# Patient Record
Sex: MALE | Race: WHITE | Employment: FULL TIME | URBAN - METROPOLITAN AREA
[De-identification: names, ages, dates, MRNs, and addresses within clinical notes are randomized per-mention and may not be internally consistent; named-entity substitution may affect disease eponyms.]

---

## 2017-01-18 ENCOUNTER — ANTI-COAG VISIT (OUTPATIENT)
Dept: FAMILY MEDICINE CLINIC | Age: 57
End: 2017-01-18

## 2017-01-18 DIAGNOSIS — Z86.718 PERSONAL HISTORY OF DVT (DEEP VEIN THROMBOSIS): ICD-10-CM

## 2017-01-18 LAB
INTERNATIONAL NORMALIZATION RATIO, POC: 2.1
PROTHROMBIN TIME, POC: NORMAL

## 2017-01-18 PROCEDURE — 99999 PR OFFICE/OUTPT VISIT,PROCEDURE ONLY: CPT | Performed by: INTERNAL MEDICINE

## 2017-01-18 PROCEDURE — 85610 PROTHROMBIN TIME: CPT | Performed by: INTERNAL MEDICINE

## 2017-02-15 ENCOUNTER — NURSE ONLY (OUTPATIENT)
Dept: FAMILY MEDICINE CLINIC | Age: 57
End: 2017-02-15

## 2017-02-15 DIAGNOSIS — Z86.718 PERSONAL HISTORY OF DVT (DEEP VEIN THROMBOSIS): ICD-10-CM

## 2017-02-15 LAB
INTERNATIONAL NORMALIZATION RATIO, POC: 2.1
PROTHROMBIN TIME, POC: NORMAL

## 2017-02-15 PROCEDURE — 99999 PR OFFICE/OUTPT VISIT,PROCEDURE ONLY: CPT | Performed by: INTERNAL MEDICINE

## 2017-02-15 PROCEDURE — 85610 PROTHROMBIN TIME: CPT | Performed by: INTERNAL MEDICINE

## 2017-03-03 RX ORDER — WARFARIN SODIUM 5 MG/1
TABLET ORAL
Qty: 30 TABLET | Refills: 8 | Status: SHIPPED | OUTPATIENT
Start: 2017-03-03 | End: 2018-04-30 | Stop reason: ALTCHOICE

## 2017-03-15 ENCOUNTER — ANTI-COAG VISIT (OUTPATIENT)
Dept: FAMILY MEDICINE CLINIC | Age: 57
End: 2017-03-15

## 2017-03-15 DIAGNOSIS — Z86.718 PERSONAL HISTORY OF DVT (DEEP VEIN THROMBOSIS): Primary | ICD-10-CM

## 2017-03-15 DIAGNOSIS — Z79.01 LONG TERM CURRENT USE OF ANTICOAGULANTS WITH INR GOAL OF 2.0-3.0: ICD-10-CM

## 2017-03-15 LAB
INTERNATIONAL NORMALIZATION RATIO, POC: 2.4
PROTHROMBIN TIME, POC: NORMAL

## 2017-03-15 PROCEDURE — 99999 PR OFFICE/OUTPT VISIT,PROCEDURE ONLY: CPT | Performed by: INTERNAL MEDICINE

## 2017-03-15 PROCEDURE — 85610 PROTHROMBIN TIME: CPT | Performed by: INTERNAL MEDICINE

## 2017-03-31 ENCOUNTER — TELEPHONE (OUTPATIENT)
Dept: FAMILY MEDICINE CLINIC | Age: 57
End: 2017-03-31

## 2017-04-12 ENCOUNTER — ANTI-COAG VISIT (OUTPATIENT)
Dept: FAMILY MEDICINE CLINIC | Age: 57
End: 2017-04-12

## 2017-04-12 DIAGNOSIS — Z79.01 LONG TERM CURRENT USE OF ANTICOAGULANTS WITH INR GOAL OF 2.0-3.0: ICD-10-CM

## 2017-04-12 DIAGNOSIS — Z86.718 PERSONAL HISTORY OF DVT (DEEP VEIN THROMBOSIS): ICD-10-CM

## 2017-04-12 LAB
INTERNATIONAL NORMALIZATION RATIO, POC: 2.2
PROTHROMBIN TIME, POC: NORMAL

## 2017-04-12 PROCEDURE — 85610 PROTHROMBIN TIME: CPT | Performed by: INTERNAL MEDICINE

## 2017-04-12 PROCEDURE — 99999 PR OFFICE/OUTPT VISIT,PROCEDURE ONLY: CPT | Performed by: INTERNAL MEDICINE

## 2017-05-10 ENCOUNTER — TELEPHONE (OUTPATIENT)
Dept: FAMILY MEDICINE CLINIC | Age: 57
End: 2017-05-10

## 2017-05-10 ENCOUNTER — ANTI-COAG VISIT (OUTPATIENT)
Dept: FAMILY MEDICINE CLINIC | Age: 57
End: 2017-05-10

## 2017-05-10 DIAGNOSIS — Z86.718 PERSONAL HISTORY OF DVT (DEEP VEIN THROMBOSIS): ICD-10-CM

## 2017-05-10 DIAGNOSIS — Z79.01 LONG TERM CURRENT USE OF ANTICOAGULANTS WITH INR GOAL OF 2.0-3.0: ICD-10-CM

## 2017-05-10 LAB
INTERNATIONAL NORMALIZATION RATIO, POC: 2.4
PROTHROMBIN TIME, POC: NORMAL

## 2017-05-10 PROCEDURE — 99999 PR OFFICE/OUTPT VISIT,PROCEDURE ONLY: CPT | Performed by: INTERNAL MEDICINE

## 2017-05-10 PROCEDURE — 85610 PROTHROMBIN TIME: CPT | Performed by: INTERNAL MEDICINE

## 2017-05-17 ENCOUNTER — TELEPHONE (OUTPATIENT)
Dept: FAMILY MEDICINE CLINIC | Age: 57
End: 2017-05-17

## 2017-06-07 ENCOUNTER — ANTI-COAG VISIT (OUTPATIENT)
Dept: FAMILY MEDICINE CLINIC | Age: 57
End: 2017-06-07

## 2017-06-07 DIAGNOSIS — Z79.01 LONG TERM CURRENT USE OF ANTICOAGULANTS WITH INR GOAL OF 2.0-3.0: ICD-10-CM

## 2017-06-07 DIAGNOSIS — Z86.718 PERSONAL HISTORY OF DVT (DEEP VEIN THROMBOSIS): ICD-10-CM

## 2017-06-07 LAB
INTERNATIONAL NORMALIZATION RATIO, POC: 3
PROTHROMBIN TIME, POC: NORMAL

## 2017-06-07 PROCEDURE — 85610 PROTHROMBIN TIME: CPT | Performed by: INTERNAL MEDICINE

## 2017-06-07 PROCEDURE — 99999 PR OFFICE/OUTPT VISIT,PROCEDURE ONLY: CPT | Performed by: FAMILY MEDICINE

## 2017-06-09 ENCOUNTER — OFFICE VISIT (OUTPATIENT)
Dept: FAMILY MEDICINE CLINIC | Age: 57
End: 2017-06-09

## 2017-06-09 VITALS
BODY MASS INDEX: 26.13 KG/M2 | SYSTOLIC BLOOD PRESSURE: 104 MMHG | WEIGHT: 176.4 LBS | HEIGHT: 69 IN | DIASTOLIC BLOOD PRESSURE: 72 MMHG | OXYGEN SATURATION: 97 % | HEART RATE: 74 BPM | RESPIRATION RATE: 18 BRPM

## 2017-06-09 DIAGNOSIS — Z86.718 PERSONAL HISTORY OF DVT (DEEP VEIN THROMBOSIS): ICD-10-CM

## 2017-06-09 PROCEDURE — 99213 OFFICE O/P EST LOW 20 MIN: CPT | Performed by: INTERNAL MEDICINE

## 2017-06-09 ASSESSMENT — ENCOUNTER SYMPTOMS
RESPIRATORY NEGATIVE: 1
GASTROINTESTINAL NEGATIVE: 1
ALLERGIC/IMMUNOLOGIC NEGATIVE: 1

## 2017-06-09 ASSESSMENT — PATIENT HEALTH QUESTIONNAIRE - PHQ9
1. LITTLE INTEREST OR PLEASURE IN DOING THINGS: 0
SUM OF ALL RESPONSES TO PHQ QUESTIONS 1-9: 0
2. FEELING DOWN, DEPRESSED OR HOPELESS: 0
SUM OF ALL RESPONSES TO PHQ9 QUESTIONS 1 & 2: 0

## 2017-06-12 ENCOUNTER — TELEPHONE (OUTPATIENT)
Dept: FAMILY MEDICINE CLINIC | Age: 57
End: 2017-06-12

## 2017-06-20 ENCOUNTER — HOSPITAL ENCOUNTER (OUTPATIENT)
Dept: VASCULAR LAB | Age: 57
Discharge: HOME | End: 2017-06-20
Attending: INTERNAL MEDICINE | Admitting: INTERNAL MEDICINE

## 2017-06-20 ENCOUNTER — ANTI-COAG VISIT (OUTPATIENT)
Dept: FAMILY MEDICINE CLINIC | Age: 57
End: 2017-06-20

## 2017-06-20 DIAGNOSIS — Z79.01 LONG TERM CURRENT USE OF ANTICOAGULANTS WITH INR GOAL OF 2.0-3.0: ICD-10-CM

## 2017-06-20 DIAGNOSIS — Z86.718 PERSONAL HISTORY OF OTHER VENOUS THROMBOSIS AND EMBOLISM: ICD-10-CM

## 2017-06-20 DIAGNOSIS — Z86.718 PERSONAL HISTORY OF DVT (DEEP VEIN THROMBOSIS): ICD-10-CM

## 2017-06-20 LAB
INTERNATIONAL NORMALIZATION RATIO, POC: 2.2
PROTHROMBIN TIME, POC: NORMAL

## 2017-06-20 PROCEDURE — 85610 PROTHROMBIN TIME: CPT | Performed by: INTERNAL MEDICINE

## 2017-06-20 PROCEDURE — 99999 PR OFFICE/OUTPT VISIT,PROCEDURE ONLY: CPT | Performed by: INTERNAL MEDICINE

## 2017-08-09 ENCOUNTER — TELEPHONE (OUTPATIENT)
Dept: FAMILY MEDICINE CLINIC | Age: 57
End: 2017-08-09

## 2017-11-02 ENCOUNTER — OFFICE VISIT (OUTPATIENT)
Dept: DERMATOLOGY | Age: 57
End: 2017-11-02

## 2017-11-02 DIAGNOSIS — D22.5 MULTIPLE BENIGN MELANOCYTIC NEVI OF UPPER AND LOWER EXTREMITIES AND TRUNK: ICD-10-CM

## 2017-11-02 DIAGNOSIS — S60.10XA SUBUNGUAL HEMATOMA OF DIGIT OF HAND, INITIAL ENCOUNTER: ICD-10-CM

## 2017-11-02 DIAGNOSIS — L82.1 SEBORRHEIC KERATOSIS: ICD-10-CM

## 2017-11-02 DIAGNOSIS — L57.0 ACTINIC KERATOSES: Primary | ICD-10-CM

## 2017-11-02 DIAGNOSIS — D22.70 MULTIPLE BENIGN MELANOCYTIC NEVI OF UPPER AND LOWER EXTREMITIES AND TRUNK: ICD-10-CM

## 2017-11-02 DIAGNOSIS — D22.60 MULTIPLE BENIGN MELANOCYTIC NEVI OF UPPER AND LOWER EXTREMITIES AND TRUNK: ICD-10-CM

## 2017-11-02 DIAGNOSIS — L21.9 SEBORRHEIC DERMATITIS OF SCALP: ICD-10-CM

## 2017-11-02 DIAGNOSIS — Z87.891 FORMER SMOKER: ICD-10-CM

## 2017-11-02 PROCEDURE — 99203 OFFICE O/P NEW LOW 30 MIN: CPT | Performed by: DERMATOLOGY

## 2017-11-02 PROCEDURE — 17000 DESTRUCT PREMALG LESION: CPT | Performed by: DERMATOLOGY

## 2017-11-02 PROCEDURE — 17003 DESTRUCT PREMALG LES 2-14: CPT | Performed by: DERMATOLOGY

## 2017-11-02 RX ORDER — KETOCONAZOLE 20 MG/ML
SHAMPOO TOPICAL
Qty: 120 ML | Refills: 3 | Status: SHIPPED | OUTPATIENT
Start: 2017-11-02 | End: 2018-04-30

## 2017-11-02 NOTE — PATIENT INSTRUCTIONS
Sun Protection Tips    · Apply broad spectrum water resistant sunscreen with an SPF of at least 30 to exposed areas of the skin. Dont forget the ears and lips! Remember to reapply sunscreen about every 2 hours and after swimming or sweating. · Wear sun protective clothing. Swim shirts (aka. rash guards) are a great idea and negates the need to reapply sunscreen in those areas. · Seek the shade whenever possible especially between the hours of 10am and 4pm when the suns rays are the strongest.     · Avoid tanning beds    Cryosurgery (Freezing) Wound Care Instructions    AFTER THE PROCEDURE:    You will notice swelling and redness around the site. This is normal.    You may experience a sharp or sore feeling for the next several days. For this discomfort, you may take acetaminophen (Tylenol©).  A blister may develop at the treated area, sometimes as soon as by the end of the day. After several days, the blister will subside and a scab will form.  If the area is bumped or traumatized during the first few days following freezing, you may develop bleeding into the blister, forming a blood blister. This is nothing to be alarmed about.  If the blister is tense, uncomfortable, or much larger than the site that was frozen, you may pop the blister along its edge with a sterile needle (boiled, heated under a flame, or cleaned with alcohol) to allow the fluid to drain out. If the blister does not bother you, no treatment is needed.  Do NOT peel off the top of the blister roof. It will act as a dressing on top of your wound. WOUND CARE:    You may shower or bathe as usual, but avoid scrubbing the areas that have been frozen.  Cleanse the site twice a day with mild soapy water, and then apply a thin film of white petrolatum (Vaseline©).  You do not need to cover the area, but can if you prefer.     Do NOT allow the site to become dry or crusted, or attempt to dry it out with rubbing alcohol or

## 2017-11-02 NOTE — PROGRESS NOTES
[facility]) Dermatology  [place], [place], [address]       Ema Collado  1960    62 y.o. male     Date of Visit: 11/2/2017    Chief Complaint:   Chief Complaint   Patient presents with    New Patient    Skin Exam     full body skin exam    Other     Has areas of concern- lower belly area and lower back as well as arms- no OTC or prescription medications used        I was asked to see this patient by Dr. Heredia ref. provider found. History of Present Illness:  Ema Collado is a 62 y.o. male who presents with the chief complaint of establish care and for TBSE. Here for evaluation of multiple moles on body, all present for many years. Denies new moles. Denies moles changing in size, shape, color. None associated w/ pain, bleeding, pruritus. He is concerned about a dark discoloration to his left 4th digit that first appeared over last few months. He does not remember trauma to the area, No change in size, shape, or color. Does have multiple scaly spots to face that feel dry and irritated at times. They do not resolve on their own and have been present for several months to years. No prior treatment to lesions. He also complains of flaky dry scalp for months to years, intermittent flares. No prior treatment performed to lesions. Denies regularly wearing sunscreen or protective hats/clothing when outdoors        Review of Systems:  Constitutional: Reports general sense of well-being   Skin: No new or changing moles, no history of keloids or hypertrophic scars. Heme: No abnormal bruising or bleeding. Past Medical History, Family History, Surgical History, Medications and Allergies reviewed. Past Skin Hx:   Patient denies past history of melanoma, NMSC, dysplastic nevi, or chronic skin rashes.     PFHx: Sister-skin cancer, unsure what type, Father- believes MM    Family History   Problem Relation Age of Onset    Mental Illness Sister     Substance Abuse Sister     Cancer Sister skin cancer around nose    Alzheimer's Disease Maternal Grandmother     Cancer Maternal Grandfather      prostate    Heart Disease Maternal Grandfather 39     bypass    Alzheimer's Disease Paternal Grandmother     Stroke Paternal Grandfather 48    Alzheimer's Disease Paternal Grandfather     Other Father      enlarged prostate    Cancer Father      skin cancer- atypical mole     Past Medical History:   Diagnosis Date    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    Blood clot due to device, implant, or graft     Kidney calculi      Past Surgical History:   Procedure Laterality Date    HERNIA REPAIR      inguinal as a child    TUMOR REMOVAL  2000    L eye and metal implants put in bone       Allergies   Allergen Reactions    Fish-Derived Products      Also allergic to nuts and beans - unable to list from allergies     Outpatient Prescriptions Marked as Taking for the 11/2/17 encounter (Office Visit) with [facility], DO   Medication Sig Dispense Refill    ketoconazole (NIZORAL) 2 % shampoo Apply to scalp TIW, leave lather on for 5 min. Before rinsing off 120 mL 3       Social History:   Social History     Social History    Marital status:      Spouse name: N/A    Number of children: N/A    Years of education: N/A     Occupational History          Social History Main Topics    Smoking status: Former Smoker     Years: 1.00     Types: Cigars     Quit date: 2/15/2011    Smokeless tobacco: Never Used    Alcohol use 3.6 - 4.2 oz/week     6 - 7 Standard drinks or equivalent per week      Comment: 1 beer a day    Drug use: No    Sexual activity: Not on file     Other Topics Concern    Not on file     Social History Narrative    No narrative on file       Physical Examination     The following were examined and determined to be normal: Psych/Neuro, Conjunctivae/eyelids, Gums/teeth/lips, Neck, Breast/axilla/chest, Abdomen, Back, RUE, LUE, RLE, LLE and groin/buttocks.  Genitalia not patient regarding their chronic and benign nature. No treatment performed    3. Multiple benign melanocytic nevi of upper and lower extremities and trunk  Benign acquired melanocytic nevi  -Recommend monthly self skin exams   -Educated regarding the ABCDEs of melanoma detection   -Call for any new/changing moles or concerning lesions  -Reviewed sun protective behavior -- sun avoidance during the peak hours of the day, sun-protective clothing (including hat and sunglasses), sunscreen use (water resistant, broad spectrum, SPF at least 30, need for reapplication every 2 to 3 hours), avoidance of tanning beds   -Plan: Observation with annual skin checks (earlier if indicated) performed in office to monitor current nevi and to assess for new lesions. 4. Seborrheic dermatitis of scalp  Seborrheic dermatitis, scalp  -Ketoconazole shampoo TIW as maintenance. Lather and leave on scalp for five minutes before rinsing off. Alternate on other days with OTC shampoo such as selsun blue dry itchy scalp or head and shoulders clinical strength dandruff shampoo. - ketoconazole (NIZORAL) 2 % shampoo; Apply to scalp TIW, leave lather on for 5 min. Before rinsing off  Dispense: 120 mL; Refill: 3    5. Subungual hematoma of digit of hand, initial encounter  Left 4th digit   -Reassurance  -Observation, pt to call if doesn't resolve over next few months    6. Former smoker  -continue with smoking cessation        Return in about 1 year (around 11/2/2018) for TBSE.

## 2018-04-30 ENCOUNTER — OFFICE VISIT (OUTPATIENT)
Dept: FAMILY MEDICINE CLINIC | Age: 58
End: 2018-04-30

## 2018-04-30 VITALS
WEIGHT: 169 LBS | SYSTOLIC BLOOD PRESSURE: 112 MMHG | BODY MASS INDEX: 24.2 KG/M2 | OXYGEN SATURATION: 96 % | HEART RATE: 67 BPM | HEIGHT: 70 IN | RESPIRATION RATE: 16 BRPM | DIASTOLIC BLOOD PRESSURE: 72 MMHG

## 2018-04-30 DIAGNOSIS — Z86.718 PERSONAL HISTORY OF DVT (DEEP VEIN THROMBOSIS): ICD-10-CM

## 2018-04-30 DIAGNOSIS — Z12.11 COLON CANCER SCREENING: Primary | ICD-10-CM

## 2018-04-30 DIAGNOSIS — K92.1 BLOOD IN STOOL: ICD-10-CM

## 2018-04-30 LAB
CONTROL: ABNORMAL
HEMOCCULT STL QL: POSITIVE

## 2018-04-30 PROCEDURE — 99213 OFFICE O/P EST LOW 20 MIN: CPT | Performed by: INTERNAL MEDICINE

## 2018-04-30 PROCEDURE — 82274 ASSAY TEST FOR BLOOD FECAL: CPT | Performed by: INTERNAL MEDICINE

## 2018-04-30 ASSESSMENT — ENCOUNTER SYMPTOMS
GASTROINTESTINAL NEGATIVE: 1
RESPIRATORY NEGATIVE: 1

## 2018-06-12 ENCOUNTER — OFFICE VISIT (OUTPATIENT)
Dept: DERMATOLOGY | Age: 58
End: 2018-06-12

## 2018-06-12 DIAGNOSIS — L21.9 SEBORRHEIC DERMATITIS OF SCALP: ICD-10-CM

## 2018-06-12 DIAGNOSIS — L57.0 ACTINIC KERATOSIS: Primary | ICD-10-CM

## 2018-06-12 DIAGNOSIS — Z87.19 HISTORY OF ORAL LESIONS: ICD-10-CM

## 2018-06-12 DIAGNOSIS — D18.01 CHERRY ANGIOMA: ICD-10-CM

## 2018-06-12 DIAGNOSIS — L82.1 SEBORRHEIC KERATOSIS: ICD-10-CM

## 2018-06-12 PROCEDURE — 17110 DESTRUCTION B9 LES UP TO 14: CPT | Performed by: DERMATOLOGY

## 2018-06-12 PROCEDURE — 17000 DESTRUCT PREMALG LESION: CPT | Performed by: DERMATOLOGY

## 2018-06-12 PROCEDURE — 99213 OFFICE O/P EST LOW 20 MIN: CPT | Performed by: DERMATOLOGY

## 2018-06-15 ENCOUNTER — TELEPHONE (OUTPATIENT)
Dept: ADMINISTRATIVE | Age: 58
End: 2018-06-15

## 2018-06-18 ENCOUNTER — TELEPHONE (OUTPATIENT)
Dept: DERMATOLOGY | Age: 58
End: 2018-06-18

## 2018-07-20 ENCOUNTER — TELEPHONE (OUTPATIENT)
Dept: DERMATOLOGY | Age: 58
End: 2018-07-20

## 2018-09-20 ENCOUNTER — TELEPHONE (OUTPATIENT)
Dept: DERMATOLOGY | Age: 58
End: 2018-09-20

## 2018-11-08 ENCOUNTER — OFFICE VISIT (OUTPATIENT)
Dept: DERMATOLOGY | Age: 58
End: 2018-11-08
Payer: COMMERCIAL

## 2018-11-08 DIAGNOSIS — D22.9 MULTIPLE BENIGN NEVI: ICD-10-CM

## 2018-11-08 DIAGNOSIS — L57.0 ACTINIC KERATOSES: Primary | ICD-10-CM

## 2018-11-08 DIAGNOSIS — L21.9 SEBORRHEIC DERMATITIS: ICD-10-CM

## 2018-11-08 DIAGNOSIS — Z78.9 NORMAL SKIN APPEARANCE: ICD-10-CM

## 2018-11-08 DIAGNOSIS — L81.4 LENTIGINES: ICD-10-CM

## 2018-11-08 PROCEDURE — 17000 DESTRUCT PREMALG LESION: CPT | Performed by: DERMATOLOGY

## 2018-11-08 PROCEDURE — 99213 OFFICE O/P EST LOW 20 MIN: CPT | Performed by: DERMATOLOGY

## 2018-11-08 PROCEDURE — 17003 DESTRUCT PREMALG LES 2-14: CPT | Performed by: DERMATOLOGY

## 2018-11-08 RX ORDER — KETOCONAZOLE 20 MG/ML
SHAMPOO TOPICAL
Qty: 120 ML | Refills: 3 | Status: SHIPPED | OUTPATIENT
Start: 2018-11-08 | End: 2020-10-29

## 2018-11-08 RX ORDER — CLOBETASOL PROPIONATE 0.46 MG/ML
SOLUTION TOPICAL
Qty: 50 ML | Refills: 1 | Status: SHIPPED | OUTPATIENT
Start: 2018-11-08

## 2019-11-04 ENCOUNTER — OFFICE VISIT (OUTPATIENT)
Dept: DERMATOLOGY | Age: 59
End: 2019-11-04
Payer: COMMERCIAL

## 2019-11-04 DIAGNOSIS — Z78.9 NORMAL SKIN APPEARANCE: ICD-10-CM

## 2019-11-04 DIAGNOSIS — L21.9 SEBORRHEIC DERMATITIS: ICD-10-CM

## 2019-11-04 DIAGNOSIS — D48.9 NEOPLASM OF UNCERTAIN BEHAVIOR: Primary | ICD-10-CM

## 2019-11-04 DIAGNOSIS — D22.9 MULTIPLE BENIGN MELANOCYTIC NEVI: ICD-10-CM

## 2019-11-04 PROCEDURE — 11102 TANGNTL BX SKIN SINGLE LES: CPT | Performed by: DERMATOLOGY

## 2019-11-04 PROCEDURE — 99213 OFFICE O/P EST LOW 20 MIN: CPT | Performed by: DERMATOLOGY

## 2019-11-06 LAB — DERMATOLOGY PATHOLOGY REPORT: NORMAL

## 2019-11-07 ENCOUNTER — TELEPHONE (OUTPATIENT)
Dept: DERMATOLOGY | Age: 59
End: 2019-11-07

## 2020-10-27 NOTE — PATIENT INSTRUCTIONS
Sun Protection Tips    Apply broad spectrum water resistant sunscreen with an SPF of at least 30 to exposed areas of the skin. Dont forget the ears and lips! Remember to reapply sunscreen about every 2 hours and after swimming or sweating. Wear sun protective clothing. Swim shirts (aka. rash guards) are a great idea and negates the need to reapply sunscreen in those areas. Seek the shade whenever possible especially between the hours of 10am and 4pm when the suns rays are the strongest.     Avoid tanning beds          Wear a wide brim hat while in the sun    Biopsy Wound Care Instructions   Cleanse the wound twice a day with mild soapy water.  Gently dry the area.  Apply Vaseline/Aquaphor to the wound using a cotton tipped applicator or Qtip.  Cover with a clean bandage.  Repeat this process until the biopsy site is healed. You will know when it's healed when it's pink and shiny.  You may shower and bathe as usual.      If bleeding should occur, apply firm pressure to bleeding area for 15 minutes and repeat if needed. If bleeding continues after 30 minutes, please call office and ask to speak to Daria.        ** Biopsy results generally take around 7-10  business days to come back. A member of Dr. Jaylin Brody staff will call you when the results are available. If you don't hear from our staff after the 10 business days, please call our office for your results. Thanks for your visit! Feel free to call Dr. Onesimo Orosco assistant, Daria if you have questions, concerns or to schedule your cosmetic procedures.

## 2020-10-29 ENCOUNTER — OFFICE VISIT (OUTPATIENT)
Dept: DERMATOLOGY | Age: 60
End: 2020-10-29
Payer: COMMERCIAL

## 2020-10-29 VITALS — TEMPERATURE: 97.9 F

## 2020-10-29 PROCEDURE — 99214 OFFICE O/P EST MOD 30 MIN: CPT | Performed by: DERMATOLOGY

## 2020-10-29 PROCEDURE — 11102 TANGNTL BX SKIN SINGLE LES: CPT | Performed by: DERMATOLOGY

## 2020-10-29 RX ORDER — DESONIDE 0.5 MG/G
OINTMENT TOPICAL
Qty: 60 G | Refills: 0 | Status: SHIPPED | OUTPATIENT
Start: 2020-10-29

## 2020-10-29 RX ORDER — KETOCONAZOLE 20 MG/ML
SHAMPOO TOPICAL
Qty: 120 ML | Refills: 3 | Status: SHIPPED | OUTPATIENT
Start: 2020-10-29 | End: 2021-09-14 | Stop reason: SDUPTHER

## 2020-10-29 NOTE — PROGRESS NOTES
[address], [place], [address]       Annalee Rutherford  1960    61 y.o. male     Date of Visit: 10/29/2020    Chief Complaint:   Chief Complaint   Patient presents with    Skin Exam     moles, tbse        I was asked to see this patient by Dr. Heredia ref. provider found. History of Present Illness:  Annalee Rutherford is a 61 y.o. male who presents with the chief complaint of the followin. Total body skin cancer screening exam. Many year history of multiple nevi on the head/neck, trunk and extremities, all present for many years. Denies new moles. Denies moles changing in size, shape, color. None associated w/ pain, bleeding, pruritus. Pt denies any changing or concerning lesions, denies any new concerns. Denies any new or changing pigmented lesions. 2.  New rash rash to face located on forehead, cheeks, nose, patient with beard covering most of lower face. Denies significant itching, denies pain. slight irritation. Patient does admit to applying hemp-based product to his forehead, cheeks, nose at least once weekly for several months. Inconsistent washing of face. No documented personal or family history of autoimmune disease such as lupus noted in chart   3. Chronic history of seborrheic dermatitis to scalp, patient states some days the dandruff is worse than other days. Patient history of declining use of ketoconazole 2% shampoo as he was concerned it would cause his hair to be dry and fall out. He instead chose to use a hemp-based shampoo product and sometimes shampoos with routine use. Only shampoo scalp about 1 to 2 days/week. Declined to use topical betazole to scalp as well. 4.  2 new rough feeling raised spots to right superior chest,he denies associated burning, bleeding, pain, or itch. Admits to sun exposure in youth without wearing sunscreen, hats, or protective clothing.  Wears hats regularly when outdoors, denies wearing sunscreen daily.      Review of Systems:  Constitutional: Reports general sense of well-being   Skin: No new or changing moles, no tendency to develop thick scars, no interval of severe sunburns  Heme: No abnormal bruising or bleeding. Past Skin Hx:  -11/2019-history of benign compound nevus on biopsy located to left lateral inferior  -History of seborrheic dermatitis to scalp-ketoconazole 2% shampoo 3 times weekly, clobetasol 0.05% solution sparingly as needed flares- pt noncompliant with treatment as he is concerned with side effects. Prefers to use Hemp oil daily as needed.    -Hx of AKs s/p cryotherapy   -History of lentigines  -History of perianal itching-use only lukewarm water to clean and pat dry, avoid contact irritants spicy food, rough foods   Patient denies past history of melanoma, NMSC, dysplastic nevi     PFHx: Father with dysplastic nevus and Sr. with skin cancer-unsure what type       ADDITIONAL HISTORY:    I have reviewed past medical and surgical histories, current medications, allergies, social and family histories as documented in the patient's electronic medical record.     Family History   Problem Relation Age of Onset    Mental Illness Sister     Substance Abuse Sister     Cancer Sister         skin cancer around nose    Alzheimer's Disease Maternal Grandmother     Cancer Maternal Grandfather         prostate    Heart Disease Maternal Grandfather 39        bypass    Alzheimer's Disease Paternal Grandmother     Stroke Paternal Grandfather 48    Alzheimer's Disease Paternal Grandfather     Other Father         enlarged prostate    Cancer Father         skin cancer- atypical mole     Past Medical History:   Diagnosis Date    Actinic keratosis     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    Blood clot due to device, implant, or graft     Kidney calculi      Past Surgical History:   Procedure Laterality Date    HERNIA REPAIR      inguinal as a child    TUMOR REMOVAL  2000    L eye and metal implants put in bone       Allergies   Allergen Reactions    Fish-Derived Products      Also allergic to nuts and beans - unable to list from allergies    Nuts [Peanut-Containing Drug Products]      No outpatient medications have been marked as taking for the 10/29/20 encounter (Office Visit) with Jakub Jameson DO. Social History:   Social History     Socioeconomic History    Marital status:      Spouse name: Not on file    Number of children: Not on file    Years of education: Not on file    Highest education level: Not on file   Occupational History    Occupation:    Social Needs    Financial resource strain: Not on file    Food insecurity     Worry: Not on file     Inability: Not on file   [employer] needs     Medical: Not on file     Non-medical: Not on file   Tobacco Use    Smoking status: Former Smoker     Years: 1.00     Types: Cigars     Last attempt to quit: 2/15/2011     Years since quittin.7    Smokeless tobacco: Never Used   Substance and Sexual Activity    Alcohol use:  Yes     Alcohol/week: 6.0 - 7.0 standard drinks     Types: 6 - 7 Standard drinks or equivalent per week     Comment: 1 beer a day    Drug use: No    Sexual activity: Not on file   Lifestyle    Physical activity     Days per week: Not on file     Minutes per session: Not on file    Stress: Not on file   Relationships    Social connections     Talks on phone: Not on file     Gets together: Not on file     Attends Judaism service: Not on file     Active member of club or organization: Not on file     Attends meetings of clubs or organizations: Not on file     Relationship status: Not on file    Intimate partner violence     Fear of current or ex partner: Not on file     Emotionally abused: Not on file     Physically abused: Not on file     Forced sexual activity: Not on file   Other Topics Concern    Not on file   Social History Narrative    Not on file       Physical Examination     The following were examined and determined to be normal: Psych/Neuro, Conjunctivae/eyelids, Gums/teeth/lips, Neck, Breast/axilla/chest, Abdomen, Back, RUE, LUE, RLE, LLE and Nails/digits. Areas covered by underwear garment(s) not examined. The following were examined and determined to be abnormal: Scalp/hair and Head/face. Ma phototype: 2    -Constitutional: Well appearing, no acute distress  -Neurological: Alert and oriented X 3  -Mood and Affect: Pleasant  Total body skin exam performed, areas examined listed above: 1. Left inferior back-0.7 x 0.5 cm irregular bordered asymmetrical light to medium brown papule      2. Forehead, cheeks, nose-ill-defined erythematous smooth to slightly scaly macules and patches, limited amount with mild central clearing. Some macules and patches are annular. Moderately oily skin to face  3. Moderate yellow-greasy scale scattered throughout scalp, few erythematous macules noted to vertex scalp  4. Scattered on the head,neck, trunk and extremities are multiple well-defined round and oval symmetric smoothly-bordered uniformly brown macules and papules. no change in size/shape/color of any lesions; no bleeding lesions. 5.  Right superior chest-2 adjacent distinct stuck-on appearing tan-brown verrucous papules    Assessment and Plan     1. Neoplasm of uncertain behavior    2. Dermatitis    3. Seborrheic dermatitis    4. Multiple benign nevi    5. Seborrheic keratoses        1. Neoplasm of uncertain behavior  DDx: rule out dysplastic nevus  -Discussed possible diagnosis. Patient agreeable to biopsy. Verbal consent obtained after risks (infection, bleeding, scar, dyspigmentation), benefits and alternatives explained. -Area(s) to be biopsied were marked with a surgical pen. Site(s) were cleansed with alcohol. Local anesthesia achieved with 1% lidocaine with epinephrine/sodium bicarbonate. Shave biopsy performed with a razor blade. Hemostasis was achieved with aluminum chloride.  The wound(s) were dressed with petrolatum and covered with a bandage. Specimen(s) sent to pathology. Pt educated re: risk of bleeding, infection, scar, discoloration, and wound care instructions. 2. Dermatitis  -Given use of topical hemp products repeatedly for long period of time, will treat for a possible irritant/allergic contact dermatitis. If no improvement in 1 week, consider biopsy to rule out other etiologies such as seborrheic dermatitis versus tinea facei v. SCLE v. Granuloma annulare    -Start desonide 0.05% ointment twice daily to affected areas on face and vertex scalp for up to 2 weeks  -Edu re: sparing use, atrophy, striae, hypopigmentation, telangiectasias. 3. Seborrheic dermatitis  -moderate severity  -Stop hemp related products  -Start ketoconazole 2% shampoo 3 times weekly, leave lather on for 3 minutes before rinsing off. Discussed risk versus benefits including uncommon side effect of alopecia. Patient is concerned about brittle hair, advised him to use conditioner treatments after shampooing and avoid shampooing ends of hairs. -If he would like to use an additional shampoo, recommend head and shoulders antidandruff shampoo, stop fructis shampoo    4. Multiple benign nevi  Benign acquired melanocytic nevi  -Recommend monthly self skin exams   -Educated regarding the ABCDEs of melanoma detection   -Call for any new/changing moles or concerning lesions  -Reviewed sun protective behavior -- sun avoidance during the peak hours of the day, sun-protective clothing (including hat and sunglasses), sunscreen use (water resistant, broad spectrum, SPF at least 30, need for reapplication every 1.5 to 2 hours), avoidance of tanning beds   -Plan: Observation with annual skin checks (earlier if indicated) performed in office to monitor current nevi and to assess for new lesions.       5. Seborrheic keratoses  Patient educated that seborrheic keratoses have no malignant potential.    -Reassurance

## 2020-11-02 LAB — DERMATOLOGY PATHOLOGY REPORT: NORMAL

## 2020-11-03 ENCOUNTER — OFFICE VISIT (OUTPATIENT)
Dept: DERMATOLOGY | Age: 60
End: 2020-11-03
Payer: COMMERCIAL

## 2020-11-03 VITALS — TEMPERATURE: 97.7 F

## 2020-11-03 PROCEDURE — 99213 OFFICE O/P EST LOW 20 MIN: CPT | Performed by: DERMATOLOGY

## 2020-11-03 NOTE — PATIENT INSTRUCTIONS
Sun Protection Tips    Apply broad spectrum water resistant sunscreen with an SPF of at least 30 to exposed areas of the skin. Dont forget the ears and lips! Remember to reapply sunscreen about every 2 hours and after swimming or sweating. Wear sun protective clothing. Swim shirts (aka. rash guards) are a great idea and negates the need to reapply sunscreen in those areas. Seek the shade whenever possible especially between the hours of 10am and 4pm when the suns rays are the strongest.     Avoid tanning beds          Wear a wide brim hat while in the sun        Thanks for your visit! Feel free to call Dr. Naren Iverson assistantDaria if you have questions, concerns or to schedule your cosmetic procedures.

## 2020-11-03 NOTE — PROGRESS NOTES
[facility]) Dermatology  [facility], [place], [address]       Momo Moore  1960    61 y.o. male     Date of Visit: 11/3/2020    Chief Complaint:   Chief Complaint   Patient presents with    Hair/Scalp Problem     scalp improved. I was asked to see this patient by Dr. Heredia ref. provider found. History of Present Illness:  Momo Moore is a 61 y.o. male who presents with the chief complaint of the followin.  1 week follow-up for dermatitis to face, most consistent with an irritant/allergic contact dermatitis. Patient has stopped using the hemp-based products. Started to apply desonide 0.05% ointment twice daily for 1 week and has noted significant improvement with clearing of the rash to his face. Denies any burning, itching, pain. He switch to Cetaphil soap to wash face twice daily. Very satisfied with results. 2. 1 week f/u for seb derm to scalp, he stopped using hemp based products to scalp. Started ketoconazole 2% shampoo 2 to 3 days/week and has noted significant improvement in his dandruff with decreased flaking, denies any burning, itching, pain to his scalp. Very satisfied with results. Review of Systems:  Constitutional: Reports general sense of well-being   Skin: No new or changing moles, no tendency to develop thick scars, no interval of severe sunburns  Heme: No abnormal bruising or bleeding.       Past Skin Hx:  -2019-history of benign compound nevus on biopsy located to left lateral inferior  -History of seborrheic dermatitis to scalp-ketoconazole 2% shampoo 3 times weekly, clobetasol 0.05% solution sparingly as needed flares- pt noncompliant with treatment as he is concerned with side effects.  Prefers to use Hemp oil daily as needed.    -Hx of AKs s/p cryotherapy   -History of lentigines  -History of perianal itching-use only lukewarm water to clean and pat dry, avoid contact irritants spicy food, rough foods   Patient denies past history of melanoma, NMSC, dysplastic nevi     PFHx: Father with dysplastic nevus and Sr. with skin cancer-unsure what type  ADDITIONAL HISTORY:    I have reviewed past medical and surgical histories, current medications, allergies, social and family histories as documented in the patient's electronic medical record. Family History   Problem Relation Age of Onset    Mental Illness Sister     Substance Abuse Sister     Cancer Sister         skin cancer around nose    Alzheimer's Disease Maternal Grandmother     Cancer Maternal Grandfather         prostate    Heart Disease Maternal Grandfather 39        bypass    Alzheimer's Disease Paternal Grandmother     Stroke Paternal Grandfather 48    Alzheimer's Disease Paternal Grandfather     Other Father         enlarged prostate    Cancer Father         skin cancer- atypical mole     Past Medical History:   Diagnosis Date    Actinic keratosis     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    Blood clot due to device, implant, or graft     Kidney calculi      Past Surgical History:   Procedure Laterality Date    HERNIA REPAIR      inguinal as a child    TUMOR REMOVAL  2000    L eye and metal implants put in bone       Allergies   Allergen Reactions    Fish-Derived Products      Also allergic to nuts and beans - unable to list from allergies    Nuts [Peanut-Containing Drug Products]      Outpatient Medications Marked as Taking for the 11/3/20 encounter (Office Visit) with Michell Escamilla, DO   Medication Sig Dispense Refill    desonide (DESOWEN) 0.05 % ointment Apply topically to affected areas on face and top of scalp twice daily for up to 2 weeks, do not apply to cleared skin.  60 g 0    ketoconazole (NIZORAL) 2 % shampoo Apply to scalp 3 days per week on alternating days, leave lather on for 3 minutes, then rinse off. 120 mL 3    clobetasol (TEMOVATE) 0.05 % external solution Apply thin layer to affected areas on the scalp and neck nightly for 2 weeks 50 mL 1 Pleasant  Areas of skin examined as listed above:   1. Face, neck-clear  2. Scalp-mild yellow greasy scale remains, few faint erythematous macules remain to vertex scalp    Assessment and Plan     1. Contact dermatitis due to other agent, unspecified contact dermatitis type    2. Seborrheic dermatitis of scalp        1. Contact dermatitis due to other agent, unspecified contact dermatitis type  Irritant/allergic contact derm  -clear  -Avoid hemp related products in the future  -Continue with Cetaphil gentle cleanser and wash face twice daily and rinse off with lukewarm water  -Stop desonide 0.05% ointment and only use sparingly as needed flares for no longer than 1 week. 2. Seborrheic dermatitis of scalp  Improved  Mild severity remains  -avoid hemp related producs  -Continue with ketoconazole 2% shampoo 3 times weekly as instructed  -May add on Selsun Blue or head and shoulders antidandruff shampoo on alternating days with ketoconazole 2% shampoo as needed  -May condition hair ends in shower after shampooing as needed        Return to Clinic: PRN  Discussed plan with patient and/or primary caretaker. Patient to call clinic with any questions / concerns. Reviewed proper use and side effects of treatment(s) and/or medication(s) with patient and/or primary caretaker. AVS provided or is available on [employer]     Note is transcribed using voice recognition software. Inadvertent computerized transcription errors may be present.

## 2021-09-07 ENCOUNTER — TELEPHONE (OUTPATIENT)
Dept: DERMATOLOGY | Age: 61
End: 2021-09-07

## 2021-09-07 NOTE — TELEPHONE ENCOUNTER
Pt hill requesting a return call to schedule an appt w/ Dr SULLIVAN for a couple of growths one of which is black and oblong. Please advise. Thank you!

## 2021-09-13 NOTE — PATIENT INSTRUCTIONS
Sun Protection Tips    Apply broad spectrum water resistant sunscreen with an SPF of at least 30 to exposed areas of the skin. Dont forget the ears and lips! Remember to reapply sunscreen about every 2 hours and after swimming or sweating. Wear sun protective clothing. Swim shirts (aka. rash guards) are a great idea and negates the need to reapply sunscreen in those areas. Seek the shade whenever possible especially between the hours of 10am and 4pm when the suns rays are the strongest.     Avoid tanning beds          Wear a wide brim hat while in the sun    Cryosurgery (Freezing) Wound Care Instructions    AFTER THE PROCEDURE:    You will notice swelling and redness around the site. This is normal.    You may experience a sharp or sore feeling for the next several days. For this discomfort, you may take acetaminophen (Tylenol©).  A blister may develop at the treated area, sometimes as soon as by the end of the day. After several days, the blister will subside and a scab will form.  If the area is bumped or traumatized during the first few days following freezing, you may develop bleeding into the blister, forming a blood blister. This is nothing to be alarmed about.  If the blister is tense, uncomfortable, or much larger than the site that was frozen, you may pop the blister along its edge with a sterile needle (boiled, heated under a flame, or cleaned with alcohol) to allow the fluid to drain out. If the blister does not bother you, no treatment is needed.  Do NOT peel off the top of the blister roof. It will act as a dressing on top of your wound. WOUND CARE:    You may shower or bathe as usual, but avoid scrubbing the areas that have been frozen.  Cleanse the site twice a day with mild soapy water, and then apply a thin film of white petrolatum (Vaseline©).  You do not need to cover the area, but can if you prefer.     Do NOT allow the site to become dry or crusted, or attempt to dry it out with rubbing alcohol or hydrogen peroxide.  Continue this regimen until the area is pink and healed. Depending on the size and location of your cryosurgery site, healing may take 2 to 4 weeks.  The area may continue to be pink for several weeks, and over the next few months may become darker or lighter than the surrounding skin. This may be a permanent change. Biopsy Wound Care Instructions   Cleanse the wound twice a day with mild soapy water.  Gently dry the area.  Apply Vaseline/Aquaphor to the wound using a cotton tipped applicator or Qtip.  Cover with a clean bandage.  Repeat this process until the biopsy site is healed. You will know when it's healed when it's pink and shiny.  You may shower and bathe as usual.      If bleeding should occur, apply firm pressure to bleeding area for 15 minutes and repeat if needed. If bleeding continues after 30 minutes, please call office and ask to speak to Daria.        ** Biopsy results generally take around 7-10  business days to come back. A member of Dr. Leone Avers staff will call you when the results are have been reviewed and a personalized treatment plan has been established. If you don't hear from our staff after the 10 business days, please call our office for your results. Thanks for your visit! Feel free to call/[employer] message  Dr. Myrna Christy assistant, Daria if you have questions, concerns or to schedule your cosmetic procedures.

## 2021-09-14 ENCOUNTER — OFFICE VISIT (OUTPATIENT)
Dept: DERMATOLOGY | Age: 61
End: 2021-09-14
Payer: COMMERCIAL

## 2021-09-14 VITALS — TEMPERATURE: 98.8 F

## 2021-09-14 DIAGNOSIS — D48.9 NEOPLASM OF UNCERTAIN BEHAVIOR: Primary | ICD-10-CM

## 2021-09-14 DIAGNOSIS — D22.9 MULTIPLE BENIGN NEVI: ICD-10-CM

## 2021-09-14 DIAGNOSIS — L21.9 SEBORRHEIC DERMATITIS: ICD-10-CM

## 2021-09-14 DIAGNOSIS — L57.0 ACTINIC KERATOSIS: ICD-10-CM

## 2021-09-14 PROCEDURE — 17000 DESTRUCT PREMALG LESION: CPT | Performed by: DERMATOLOGY

## 2021-09-14 PROCEDURE — 99213 OFFICE O/P EST LOW 20 MIN: CPT | Performed by: DERMATOLOGY

## 2021-09-14 PROCEDURE — 11102 TANGNTL BX SKIN SINGLE LES: CPT | Performed by: DERMATOLOGY

## 2021-09-14 PROCEDURE — 11103 TANGNTL BX SKIN EA SEP/ADDL: CPT | Performed by: DERMATOLOGY

## 2021-09-14 RX ORDER — KETOCONAZOLE 20 MG/ML
SHAMPOO TOPICAL
Qty: 120 ML | Refills: 3 | Status: SHIPPED | OUTPATIENT
Start: 2021-09-14

## 2021-09-14 NOTE — PROGRESS NOTES
[facility]) Dermatology  [facility] [place], [place], [address]       Mimi Garber  1960    64 y.o. male     Date of Visit: 2021    Chief Complaint:   Chief Complaint   Patient presents with    Skin Lesion     spots, Sanjuana Hernandez Mole     right inferior back, possibly gotten larger        I was asked to see this patient by  No ref. provider found. History of Present Illness:  Mimi Garber is a 64 y.o. male who presents with the chief complaint of the followin. Patient complains of a mole to right inferior back, states his ex-wife who lives with him monitors the mole on his back for changes regularly and noticed this particular mole enlarging and darkening recently. Patient denies any pain, bleeding, pruritus. 2.  Patient request a waist up skin exam only today. Denies skin concerns to lower body. Many year history of multiple nevi on the head/neck, trunk and upper extremities, all present for many years. Denies new moles. Denies moles changing in size, shape, color. None associated w/ pain, bleeding. States that mole to left superior medial back itches occasionally. 3.  Chronic history of seborrheic dermatitis to scalp, he uses ketoconazole shampoo 2 to 3 days/week and notices significant improvement in his flaking. Would like a refill today. He is is not used clobetasol solution to his scalp. Denies pruritus or burning to scalp. Does say that when he washes a baseball hat and again fragrant detergent, he will tend to break out in acne-like bumps to his forehead. Admits to sun exposure in youth without wearing sunscreen, hats, or protective clothing.  Wears hats regularly when outdoors, denies wearing sunscreen daily.     Review of Systems:  Constitutional: Reports general sense of well-being   Skin: No new or changing moles, no tendency to develop thick scars, no interval of severe sunburns  Heme: No abnormal bruising or bleeding.       Past Skin Hx:  -2019-history of benign compound nevus on biopsy located to left lateral inferior  -History of seborrheic dermatitis to scalp-ketoconazole 2% shampoo 3 times weekly, clobetasol 0.05% solution sparingly as needed flares- pt noncompliant with treatment as he is concerned with side effects.  Prefers to use Hemp oil daily as needed.    -Hx of AKs s/p cryotherapy   -History of lentigines  -History of perianal itching-use only lukewarm water to clean and pat dry, avoid contact irritants spicy food, rough foods   -hx of contact dermatitis- avoid hemp  Patient denies past history of melanoma, NMSC, dysplastic nevi    PFHx: Sister-history of skin cancer, unsure what type. Father-history of atypical mole    ADDITIONAL HISTORY:    I have reviewed past medical and surgical histories, current medications, allergies, social and family histories as documented in the patient's electronic medical record.     Family History   Problem Relation Age of Onset    Mental Illness Sister     Substance Abuse Sister     Cancer Sister         skin cancer around nose    Alzheimer's Disease Maternal Grandmother     Cancer Maternal Grandfather         prostate    Heart Disease Maternal Grandfather 39        bypass    Alzheimer's Disease Paternal Grandmother     Stroke Paternal Grandfather 48    Alzheimer's Disease Paternal Grandfather     Other Father         enlarged prostate    Cancer Father         skin cancer- atypical mole     Past Medical History:   Diagnosis Date    Actinic keratosis     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    Blood clot due to device, implant, or graft     Kidney calculi      Past Surgical History:   Procedure Laterality Date    HERNIA REPAIR      inguinal as a child    TUMOR REMOVAL  2000    L eye and metal implants put in bone       Allergies   Allergen Reactions    Fish-Derived Products      Also allergic to nuts and beans - unable to list from allergies    Nuts [Peanut-Containing Drug Products]      Outpatient Medications Marked as Taking for the 9/14/21 encounter (Office Visit) with Pamella Hdz, DO   Medication Sig Dispense Refill    ketoconazole (NIZORAL) 2 % shampoo Apply to scalp 3 days per week on alternating days, leave lather on for 3 minutes, then rinse off. 120 mL 3       Social History:   Social History     Socioeconomic History    Marital status:      Spouse name: Not on file    Number of children: Not on file    Years of education: Not on file    Highest education level: Not on file   Occupational History    Occupation:    Tobacco Use    Smoking status: Former Smoker     Years: 1.00     Types: Cigars     Quit date: 2/15/2011     Years since quitting: 10.5    Smokeless tobacco: Never Used   Vaping Use    Vaping Use: Never used   Substance and Sexual Activity    Alcohol use: Yes     Alcohol/week: 6.0 - 7.0 standard drinks     Types: 6 - 7 Standard drinks or equivalent per week     Comment: 1 beer a day    Drug use: No    Sexual activity: Not on file   Other Topics Concern    Not on file   Social History Narrative    Not on file     Social Determinants of Health     Financial Resource Strain:     Difficulty of Paying Living Expenses:    Food Insecurity:     Worried About Running Out of Food in the Last Year:     Ran Out of Food in the Last Year:    Transportation Needs:     Lack of Transportation (Medical):      Lack of Transportation (Non-Medical):    Physical Activity:     Days of Exercise per Week:     Minutes of Exercise per Session:    Stress:     Feeling of Stress :    Social Connections:     Frequency of Communication with Friends and Family:     Frequency of Social Gatherings with Friends and Family:     Attends Mandaeism Services:     Active Member of Clubs or Organizations:     Attends Club or Organization Meetings:     Marital Status:    Intimate Partner Violence:     Fear of Current or Ex-Partner:     Emotionally Abused:     Physically Abused:     Sexually Abused:        Physical Examination     The following were examined and determined to be normal: Psych/Neuro, Conjunctivae/eyelids, Gums/teeth/lips, Neck, Breast/axilla/chest, Abdomen, RUE, LUE and Nails/digits, bilateral thighs. Areas covered by clothing/shorts/socks/shoes not examined. The following were examined and determined to be abnormal: Scalp/hair, Head/face and Back. Ma phototype: 2    -Constitutional: Well appearing, no acute distress  -Neurological: Alert and oriented X 3  -Mood and Affect: Pleasant  Areas of skin examined as listed above:   1a.  Right inferior back-0.7 x 0.5 cm oblong shaped brown papule with few focal hypopigmented macules on dermoscopy      1b. Left superior medial back-0.3 x 0.2 cm pink-brown asymmetrical irregular bordered mildly erythematous papule      2. Left inferior forehead- ill defined irreg shaped gritty keratotic pink macule(s)   3. Mild yellow-greasy scale scattered to scalp, minimal erythema noted. 4. Scattered on the head,neck, trunk and extremities are multiple well-defined round and oval symmetric smoothly-bordered uniformly brown macules and papules; no bleeding nevi. Assessment and Plan     1. Neoplasm of uncertain behavior    2. Actinic keratosis    3. Seborrheic dermatitis    4. Multiple benign nevi        1. Neoplasm of uncertain behavior  DDX:  A. Rule out dysplastic nevus  B. Rule out dysplastic nevus  -Discussed possible diagnosis. Patient agreeable to biopsy. Verbal consent obtained after risks (infection, bleeding, scar, dyspigmentation), benefits and alternatives explained. -Area(s) to be biopsied were marked with a surgical pen. Site(s) were cleansed with alcohol. Local anesthesia achieved with 1% lidocaine with epinephrine/sodium bicarbonate. Shave biopsy performed with a razor blade. Hemostasis was achieved with aluminum chloride. The wound(s) were dressed with petrolatum and covered with a bandage.  Specimen(s) sent to pathology. Pt educated re: risk of bleeding, infection, scar, discoloration, and wound care instructions. 2. Actinic keratosis  -Edu re: relationship with chronic cumulative sun exposure, low premalignant potential.   Verbal consent obtained.   -Left inferior forehead, 1 lesion(s) treated w/ liquid nitrogen x 1cycles, 3 seconds each located    Edu re: risk of blister formation, discomfort, scar, dyspigmentation. Discussed wound care. -Reviewed sun protective behavior -- sun avoidance during the peak hours of the day, sun-protective clothing (including hat and sunglasses), sunscreen use (water resistant, broad spectrum, SPF at least 30, need for reapplication every 2 to 3 hours). -Patient to contact office if AK fails to resolve despite treatment or concern for recurrence (discussed signs/symptoms to monitor for) or if patient develops side effect from therapy, such as unbearable blister, crusting, scabbing, redness, or tenderness. 3. Seborrheic dermatitis  Chronic course, mild severity, stable  - ketoconazole (NIZORAL) 2 % shampoo; Apply to scalp 3 days per week on alternating days, leave lather on for 3 minutes, then rinse off.    -Switch to Tide Free and clear or all Free and clear detergent washing hat    4. Multiple benign nevi  Benign acquired melanocytic nevi  -Recommend monthly self skin exams   -Educated regarding the ABCDEs of melanoma detection   -Call for any new/changing moles or concerning lesions  -Reviewed sun protective behavior -- sun avoidance during the peak hours of the day, sun-protective clothing (including hat and sunglasses), sunscreen use (water resistant, broad spectrum, SPF at least 30, need for reapplication every 1.5 to 2 hours), avoidance of tanning beds   -Plan: Observation with annual skin checks (earlier if indicated) performed in office to monitor current nevi and to assess for new lesions.         Return to Clinic: 1 year skin exam; pt informed to contact insurance company to find an alternative dermatologist to allow for continuity of care as I am leaving [facility] after September and will be unable to refill medications, continue treatments, or see patients for visits after Sept. Pt informed  Remaining [facility] ([facility]) dermatologists are not taking new patients/unable to accomodate taking additional patients at this time. Discussed plan with patient and/or primary caretaker. Patient to call clinic with any questions / concerns. Reviewed proper use and side effects of treatment(s) and/or medication(s) with patient and/or primary caretaker. AVS provided or is available on [facility]     Note is transcribed using voice recognition software. Inadvertent computerized transcription errors may be present.

## 2021-09-16 LAB — DERMATOLOGY PATHOLOGY REPORT: NORMAL

## 2021-09-17 ENCOUNTER — TELEPHONE (OUTPATIENT)
Dept: DERMATOLOGY | Age: 61
End: 2021-09-17

## 2021-09-17 NOTE — TELEPHONE ENCOUNTER
Pt called and left a voicemail 9/16 returning assistant call regarding his result. Please advise, Thank you!

## 2021-09-17 NOTE — TELEPHONE ENCOUNTER
Pt returned call and requested another call. I called left patient another  stating I was calling for bx results and needed to speak to him since he indicated on his HIPAA that a message with results was not approved.

## 2024-09-07 ENCOUNTER — OFFICE VISIT (OUTPATIENT)
Age: 64
End: 2024-09-07

## 2024-09-07 VITALS
TEMPERATURE: 98.2 F | HEIGHT: 70 IN | WEIGHT: 170 LBS | OXYGEN SATURATION: 99 % | BODY MASS INDEX: 24.34 KG/M2 | HEART RATE: 67 BPM | SYSTOLIC BLOOD PRESSURE: 110 MMHG | DIASTOLIC BLOOD PRESSURE: 68 MMHG

## 2024-09-07 DIAGNOSIS — Z75.8 DOES NOT HAVE PRIMARY CARE PROVIDER: ICD-10-CM

## 2024-09-07 DIAGNOSIS — R06.6 INTRACTABLE HICCUPS: Primary | ICD-10-CM

## 2024-09-07 RX ORDER — BACLOFEN 10 MG/1
10 TABLET ORAL 3 TIMES DAILY
Qty: 6 TABLET | Refills: 0 | Status: SHIPPED | OUTPATIENT
Start: 2024-09-07 | End: 2024-09-09

## 2024-09-07 RX ORDER — METOCLOPRAMIDE 10 MG/1
10 TABLET ORAL
Qty: 6 TABLET | Refills: 0 | Status: SHIPPED | OUTPATIENT
Start: 2024-09-07 | End: 2024-09-09

## 2024-09-07 ASSESSMENT — ENCOUNTER SYMPTOMS: COUGH: 1

## 2024-09-07 NOTE — PROGRESS NOTES
Cabrera Randle (: 1960) is a 63 y.o. male, Established patient, here for evaluation of the following chief complaint(s):  Hiccups (Pt states 2 weeks ago he started having issues with allergies and had a cold/Pt states he took at home covid test 2 weeks ago and was negative/Pt states hiccups started 3 days ago and he states when they first started they were constant and so pt states he would gulp, hold breath, and gulp water and that would help but pt states when he coughs, burps, or talks hiccups start up again/Pt states he went to urgent care on Thursday evening and states they could not find any thing and referred him to us)      ASSESSMENT/PLAN:    ICD-10-CM    1. Intractable hiccups  R06.6 baclofen (LIORESAL) 10 MG tablet     metoclopramide (REGLAN) 10 MG tablet      2. Does not have primary care provider  Z75.8 [facility]        Intractable hiccups  Baclofen prescribed to try to alleviate intractable hiccups  Start with the Baclofen, wait 2 hours if not resolved take the metoclopramide  Metoclopramide  prescribed to attempt to alleviate intractable hiccups  Referral to PCP provided     Discussed PCP follow up for persisting or worsening symptoms, or to return to the clinic if unable to obtain PCP follow up for worsening symptoms.    The patient tolerated their visit well. The patient and/or the family were informed of the results of any tests, a time was given to answer questions, a plan was proposed and they agreed with plan. Reviewed AVS with treatment instructions and answered questions - pt/family expresses understanding and agreement with the discussed treatment plan and AVS instructions.      SUBJECTIVE/OBJECTIVE:  HPI:   63 y.o. male presents for complaint of recurrent hiccups with an onset of Thursday morning     Admits recent cold/cough with lots of coughing  Denies Prior history of chronic hiccups    Drinking fluids an, sleeping at an include and

## 2024-09-07 NOTE — PATIENT INSTRUCTIONS
Intractable hiccups  Baclofen prescribed to try to alleviate intractable hiccups  Start with the Baclofen, wait 2 hours if not resolved take the metoclopramide  Metoclopramide  prescribed to attempt to alleviate intractable hiccups  Referral to PCP provided